# Patient Record
Sex: MALE | Employment: OTHER | URBAN - METROPOLITAN AREA
[De-identification: names, ages, dates, MRNs, and addresses within clinical notes are randomized per-mention and may not be internally consistent; named-entity substitution may affect disease eponyms.]

---

## 2019-02-25 ENCOUNTER — OFFICE VISIT (OUTPATIENT)
Dept: SURGERY | Facility: CLINIC | Age: 54
End: 2019-02-25
Payer: MEDICARE

## 2019-02-25 VITALS
DIASTOLIC BLOOD PRESSURE: 79 MMHG | HEIGHT: 74.4 IN | RESPIRATION RATE: 18 BRPM | BODY MASS INDEX: 36.83 KG/M2 | WEIGHT: 290.06 LBS | HEART RATE: 88 BPM | SYSTOLIC BLOOD PRESSURE: 131 MMHG | OXYGEN SATURATION: 95 %

## 2019-02-25 DIAGNOSIS — Z79.4 TYPE 2 DIABETES MELLITUS WITHOUT COMPLICATION, WITH LONG-TERM CURRENT USE OF INSULIN (HCC): ICD-10-CM

## 2019-02-25 DIAGNOSIS — I10 HYPERTENSION, UNSPECIFIED TYPE: Primary | ICD-10-CM

## 2019-02-25 DIAGNOSIS — E11.9 TYPE 2 DIABETES MELLITUS WITHOUT COMPLICATION, WITH LONG-TERM CURRENT USE OF INSULIN (HCC): ICD-10-CM

## 2019-02-25 DIAGNOSIS — K21.9 GASTROESOPHAGEAL REFLUX DISEASE WITHOUT ESOPHAGITIS: ICD-10-CM

## 2019-02-25 DIAGNOSIS — E55.9 VITAMIN D DEFICIENCY: ICD-10-CM

## 2019-02-25 DIAGNOSIS — G47.33 OSA ON CPAP: ICD-10-CM

## 2019-02-25 DIAGNOSIS — Z99.89 OSA ON CPAP: ICD-10-CM

## 2019-02-25 DIAGNOSIS — E53.8 LOW VITAMIN B12 LEVEL: ICD-10-CM

## 2019-02-25 DIAGNOSIS — E66.01 CLASS 2 SEVERE OBESITY DUE TO EXCESS CALORIES WITH SERIOUS COMORBIDITY AND BODY MASS INDEX (BMI) OF 36.0 TO 36.9 IN ADULT (HCC): ICD-10-CM

## 2019-02-25 PROBLEM — F41.9 ANXIETY: Status: ACTIVE | Noted: 2019-02-25

## 2019-02-25 PROBLEM — E66.9 CLASS 2 OBESITY WITH BODY MASS INDEX (BMI) OF 36.0 TO 36.9 IN ADULT: Status: ACTIVE | Noted: 2019-02-25

## 2019-02-25 PROBLEM — M10.9 GOUT: Status: ACTIVE | Noted: 2019-02-25

## 2019-02-25 PROBLEM — F32.9 MAJOR DEPRESSION: Chronic | Status: ACTIVE | Noted: 2019-02-25

## 2019-02-25 PROCEDURE — 99204 OFFICE O/P NEW MOD 45 MIN: CPT | Performed by: INTERNAL MEDICINE

## 2019-02-25 RX ORDER — HYDROCODONE BITARTRATE AND ACETAMINOPHEN 10; 325 MG/1; MG/1
TABLET ORAL
Refills: 0 | COMMUNITY
Start: 2019-02-21

## 2019-02-25 RX ORDER — RISPERIDONE 3 MG/1
TABLET, FILM COATED ORAL
COMMUNITY
Start: 2019-02-14

## 2019-02-25 RX ORDER — HYDROCHLOROTHIAZIDE 25 MG/1
TABLET ORAL
COMMUNITY
Start: 2018-09-27

## 2019-02-25 RX ORDER — ATORVASTATIN CALCIUM 20 MG/1
TABLET, FILM COATED ORAL
COMMUNITY
Start: 2018-09-27

## 2019-02-25 RX ORDER — PHENTERMINE HYDROCHLORIDE 15 MG/1
CAPSULE ORAL
Refills: 0 | COMMUNITY
Start: 2018-12-14 | End: 2019-02-26 | Stop reason: DRUGHIGH

## 2019-02-25 RX ORDER — SPIRONOLACTONE 25 MG/1
TABLET ORAL
COMMUNITY
Start: 2019-02-15

## 2019-02-25 RX ORDER — SERTRALINE HYDROCHLORIDE 100 MG/1
TABLET, FILM COATED ORAL
COMMUNITY
Start: 2019-02-14

## 2019-02-25 RX ORDER — CELECOXIB 200 MG/1
CAPSULE ORAL
COMMUNITY
Start: 2019-02-15

## 2019-02-25 RX ORDER — POLYETHYLENE GLYCOL 3350 17 G/17G
POWDER, FOR SOLUTION ORAL
COMMUNITY
Start: 2018-06-27

## 2019-02-25 RX ORDER — MELATONIN
1000
COMMUNITY

## 2019-02-25 RX ORDER — ACETAMINOPHEN 500 MG
500 TABLET ORAL
COMMUNITY

## 2019-02-25 RX ORDER — MIRTAZAPINE 30 MG/1
TABLET, FILM COATED ORAL
COMMUNITY
Start: 2019-02-14

## 2019-02-25 RX ORDER — BUPROPION HYDROCHLORIDE 150 MG/1
TABLET, EXTENDED RELEASE ORAL
COMMUNITY
Start: 2019-02-14

## 2019-02-25 RX ORDER — TOPIRAMATE 100 MG/1
TABLET, FILM COATED ORAL
COMMUNITY
Start: 2019-02-14

## 2019-02-25 RX ORDER — BACLOFEN 10 MG/1
TABLET ORAL
COMMUNITY
Start: 2019-02-14

## 2019-02-25 RX ORDER — MEMANTINE HYDROCHLORIDE 10 MG/1
TABLET ORAL
COMMUNITY
Start: 2019-02-14

## 2019-02-25 RX ORDER — ALLOPURINOL 100 MG/1
TABLET ORAL
COMMUNITY
Start: 2019-02-14

## 2019-02-25 RX ORDER — CHOLECALCIFEROL (VITAMIN D3) 125 MCG
CAPSULE ORAL
COMMUNITY

## 2019-02-25 RX ORDER — GABAPENTIN 100 MG/1
CAPSULE ORAL
COMMUNITY
Start: 2019-02-14

## 2019-02-25 RX ORDER — CLONAZEPAM 1 MG/1
TABLET ORAL
Refills: 4 | COMMUNITY
Start: 2019-02-14

## 2019-02-25 RX ORDER — ESOMEPRAZOLE MAGNESIUM 40 MG/1
40 CAPSULE, DELAYED RELEASE ORAL
COMMUNITY

## 2019-02-25 NOTE — PROGRESS NOTES
[facility] Consultation Note       Date of Consult:  2019    Patient:  Jimi Almeida  :      1965  MRN:      [id number]    Referring Provider: Dr. Shelly Blevins       Chief Complaint:  Patient presents with:  Consult: non surgic Disp:  Rfl:    hydrochlorothiazide 25 MG Oral Tab  Disp:  Rfl:    HYDROcodone-acetaminophen  MG Oral Tab TK 1 T PO Q 6 H PRN Disp:  Rfl: 0   Memantine HCl 10 MG Oral Tab  Disp:  Rfl:    metFORMIN HCl 500 MG Oral Tab  Disp:  Rfl:    mirtazapine 30 MG on file        Forced sexual activity: Not on file    Other Topics      Concerns:        Not on file    Social History Narrative      Not on file    Surgical History:    Past Surgical History:   Procedure Laterality Date   • CLOSED RX SKULL FRACTURE He has a normal mood and affect. ASSESSMENT     HYPERTENSION:  The patient's blood pressure has been well controlled. he has been checking it as instructed and has remained in relatively good control.     DIABETES:   The patient denies any episodes 10 minutes per day. 4. Increase fruit and vegetable servings to 5-6 per day. Should come to seminar    Was on phentermine with Dr Junior Arboleda. Was transferred to us. Has been off for two months, and gained 30 lbs. Would like to restart.      Diagnoses

## 2019-02-26 ENCOUNTER — TELEPHONE (OUTPATIENT)
Dept: SURGERY | Facility: CLINIC | Age: 54
End: 2019-02-26

## 2019-02-26 RX ORDER — PHENTERMINE HYDROCHLORIDE 37.5 MG/1
37.5 TABLET ORAL
Qty: 30 TABLET | Refills: 1 | Status: SHIPPED | OUTPATIENT
Start: 2019-02-26 | End: 2019-04-02

## 2019-04-02 ENCOUNTER — OFFICE VISIT (OUTPATIENT)
Dept: SURGERY | Facility: CLINIC | Age: 54
End: 2019-04-02
Payer: MEDICARE

## 2019-04-02 ENCOUNTER — DOCUMENTATION ONLY (OUTPATIENT)
Dept: SURGERY | Facility: CLINIC | Age: 54
End: 2019-04-02

## 2019-04-02 VITALS
WEIGHT: 282.88 LBS | BODY MASS INDEX: 35.92 KG/M2 | SYSTOLIC BLOOD PRESSURE: 146 MMHG | DIASTOLIC BLOOD PRESSURE: 90 MMHG | HEIGHT: 74.5 IN | HEART RATE: 82 BPM | RESPIRATION RATE: 16 BRPM

## 2019-04-02 VITALS
SYSTOLIC BLOOD PRESSURE: 146 MMHG | DIASTOLIC BLOOD PRESSURE: 90 MMHG | BODY MASS INDEX: 35.92 KG/M2 | WEIGHT: 282.88 LBS | HEART RATE: 82 BPM | HEIGHT: 74.5 IN | RESPIRATION RATE: 16 BRPM

## 2019-04-02 DIAGNOSIS — E11.9 TYPE 2 DIABETES MELLITUS WITHOUT COMPLICATION, WITH LONG-TERM CURRENT USE OF INSULIN (HCC): Primary | ICD-10-CM

## 2019-04-02 DIAGNOSIS — I10 HYPERTENSION, UNSPECIFIED TYPE: ICD-10-CM

## 2019-04-02 DIAGNOSIS — K21.9 GASTROESOPHAGEAL REFLUX DISEASE WITHOUT ESOPHAGITIS: ICD-10-CM

## 2019-04-02 DIAGNOSIS — E66.9 OBESITY (BMI 30-39.9): ICD-10-CM

## 2019-04-02 DIAGNOSIS — Z79.4 TYPE 2 DIABETES MELLITUS WITHOUT COMPLICATION, WITH LONG-TERM CURRENT USE OF INSULIN (HCC): Primary | ICD-10-CM

## 2019-04-02 DIAGNOSIS — G47.33 OSA ON CPAP: ICD-10-CM

## 2019-04-02 DIAGNOSIS — Z99.89 OSA ON CPAP: ICD-10-CM

## 2019-04-02 PROCEDURE — 99214 OFFICE O/P EST MOD 30 MIN: CPT | Performed by: INTERNAL MEDICINE

## 2019-04-02 RX ORDER — PHENTERMINE HYDROCHLORIDE 37.5 MG/1
37.5 TABLET ORAL
Qty: 30 TABLET | Refills: 2 | Status: SHIPPED | OUTPATIENT
Start: 2019-04-02 | End: 2019-07-02

## 2019-04-02 NOTE — PROGRESS NOTES
[address] [place] [address]  Dept: [phone]    4/2/2019  Bariatric New Patient Evaluation    Chief Complaint:  obesity     History of Present Illness:  Bety Chimera Family History   Problem Relation Age of Onset   • Other (Other) Mother        Social History:  Social History    Socioeconomic History      Marital status: Single      Spouse name: Not on file      Number of children: Not on file      Years of education , Rfl:       Allergies:  No Known Allergies    ROS:  Constipation, otherwise neg except as above    Physical Exam:  Vital Signs:  /90   Pulse 82   Resp 16   Ht 189.2 cm (6' 2.5\")   Wt 282 lb 14.4 oz (128.3 kg)   BMI 35.84 kg/m²   BMI:  Body mass inde

## 2019-04-02 NOTE — PROGRESS NOTES
[address]  Dept: [phone]       Patient:  Severa Arm  :      1965  MRN:      [id number]    Chief Complaint:  Patient presents wi Esomeprazole Magnesium 40 MG Oral Capsule Delayed Release Take 40 mg by mouth.  Disp:  Rfl:    gabapentin 100 MG Oral Cap  Disp:  Rfl:    hydrochlorothiazide 25 MG Oral Tab  Disp:  Rfl:    HYDROcodone-acetaminophen  MG Oral Tab TK 1 T PO Q 6 H PRN D current or ex partner: Not on file        Emotionally abused: Not on file        Physically abused: Not on file        Forced sexual activity: Not on file    Other Topics      Concerns:        Not on file    Social History Narrative      Not on file    Bonilla alert and oriented  Behavioral/Psych: positive for stress  Endocrine: negative  All other systems were reviewed and are negative    Physical Exam:   General appearance: alert, appears stated age and cooperative  Head: Normocephalic, without obvious abnorma log.  2. Drink 48-64 ounces of non-caloric beverages per day. No fruit juices or regular soda. 3. Increase activity-upper body exercises, walk 10 minutes per day. 4. Increase fruit and vegetable servings to 5-6 per day.       Attended seminar    Toleratin

## 2019-04-02 NOTE — PROGRESS NOTES
Oriented pt to the Bariatric Program; provided/reviewed packet of bariatric info, time line, referrals, etc; discussed BMI status and eligibility for surgery; attended bariatric seminar on 3/11/19; pt verbalized understanding.

## 2019-04-04 ENCOUNTER — TELEPHONE (OUTPATIENT)
Dept: SURGERY | Facility: CLINIC | Age: 54
End: 2019-04-04

## 2019-05-07 ENCOUNTER — OFFICE VISIT (OUTPATIENT)
Dept: NUTRITION/OBESITY MEDICINE | Facility: HOSPITAL | Age: 54
End: 2019-05-07
Attending: SURGERY
Payer: MEDICARE

## 2019-05-07 DIAGNOSIS — E66.01 MORBID OBESITY DUE TO EXCESS CALORIES (HCC): Primary | ICD-10-CM

## 2019-05-07 PROCEDURE — 96130 PSYCL TST EVAL PHYS/QHP 1ST: CPT | Performed by: OTHER

## 2019-05-07 PROCEDURE — 96136 PSYCL/NRPSYC TST PHY/QHP 1ST: CPT | Performed by: OTHER

## 2019-05-07 NOTE — PROGRESS NOTES
Psychological Evaluation    Patient Name: Jason Coates  Visit Date:  2019  :   1965    Reason for Referral:    Shannan Lugo is a 53year old is a single [facility] male who was referred for a psychological evaluation prior to being scheduled f medications include: Atorvastatin, Phentermine, Allopurinol, Cholecalciferol, Topiramate, Metformin, and Spironolactone. As it relates to a familial history of medical issues, [place] reported a positive familial history for diabetes and hypertension. Status Exam:  Pako Raza agreed to participate in the interview and he was cooperative with the interviewer and open in his presentation of personal information. He was oriented as to time, person, and place.  Pako Raza did not demonstrate any abnormalities in hi endorsed good eating patterns overall, with the exception of endorsing that he usually/often: eats sweets, while he sometimes: eats beef, pork, or dark meat chicken more than 2 times a week, uses regular processed meats, uses regular salad dressing, and co progress and determine if he is able to make the necessary changes to follow through with the surgery.   Also, it would be beneficial for Jovanny Elliott to bring a sibling with him to one of his appointments to learn more about the process, as well as for Jovanny Elliott to

## 2019-05-20 ENCOUNTER — APPOINTMENT (OUTPATIENT)
Dept: CARDIOLOGY | Age: 54
End: 2019-05-20

## 2019-05-21 ENCOUNTER — APPOINTMENT (OUTPATIENT)
Dept: NUTRITION/OBESITY MEDICINE | Facility: HOSPITAL | Age: 54
End: 2019-05-21
Attending: SURGERY
Payer: MEDICARE

## 2019-05-22 ENCOUNTER — TELEPHONE (OUTPATIENT)
Dept: SURGERY | Facility: CLINIC | Age: 54
End: 2019-05-22

## 2019-05-22 DIAGNOSIS — K21.9 GASTROESOPHAGEAL REFLUX DISEASE WITHOUT ESOPHAGITIS: ICD-10-CM

## 2019-05-22 DIAGNOSIS — R73.09 ABNORMAL BLOOD SUGAR: ICD-10-CM

## 2019-05-22 DIAGNOSIS — E53.8 LOW VITAMIN B12 LEVEL: ICD-10-CM

## 2019-05-22 DIAGNOSIS — Z79.4 TYPE 2 DIABETES MELLITUS WITHOUT COMPLICATION, WITH LONG-TERM CURRENT USE OF INSULIN (HCC): Primary | ICD-10-CM

## 2019-05-22 DIAGNOSIS — G47.33 OSA ON CPAP: ICD-10-CM

## 2019-05-22 DIAGNOSIS — I10 HYPERTENSION, UNSPECIFIED TYPE: ICD-10-CM

## 2019-05-22 DIAGNOSIS — E11.9 TYPE 2 DIABETES MELLITUS WITHOUT COMPLICATION, WITH LONG-TERM CURRENT USE OF INSULIN (HCC): Primary | ICD-10-CM

## 2019-05-22 DIAGNOSIS — E66.9 OBESITY (BMI 30-39.9): ICD-10-CM

## 2019-05-22 DIAGNOSIS — E55.9 VITAMIN D DEFICIENCY: ICD-10-CM

## 2019-05-22 DIAGNOSIS — Z99.89 OSA ON CPAP: ICD-10-CM

## 2019-05-25 ENCOUNTER — LAB ENCOUNTER (OUTPATIENT)
Dept: LAB | Facility: HOSPITAL | Age: 54
End: 2019-05-25
Attending: INTERNAL MEDICINE
Payer: MEDICARE

## 2019-05-25 DIAGNOSIS — E53.8 LOW VITAMIN B12 LEVEL: ICD-10-CM

## 2019-05-25 DIAGNOSIS — E55.9 VITAMIN D DEFICIENCY: ICD-10-CM

## 2019-05-25 DIAGNOSIS — R73.09 ABNORMAL BLOOD SUGAR: ICD-10-CM

## 2019-05-25 DIAGNOSIS — I10 HYPERTENSION, UNSPECIFIED TYPE: ICD-10-CM

## 2019-05-25 DIAGNOSIS — Z79.4 TYPE 2 DIABETES MELLITUS WITHOUT COMPLICATION, WITH LONG-TERM CURRENT USE OF INSULIN (HCC): ICD-10-CM

## 2019-05-25 DIAGNOSIS — E11.9 TYPE 2 DIABETES MELLITUS WITHOUT COMPLICATION, WITH LONG-TERM CURRENT USE OF INSULIN (HCC): ICD-10-CM

## 2019-05-25 DIAGNOSIS — G47.33 OSA ON CPAP: ICD-10-CM

## 2019-05-25 DIAGNOSIS — E66.9 OBESITY (BMI 30-39.9): ICD-10-CM

## 2019-05-25 DIAGNOSIS — Z99.89 OSA ON CPAP: ICD-10-CM

## 2019-05-25 DIAGNOSIS — K21.9 GASTROESOPHAGEAL REFLUX DISEASE WITHOUT ESOPHAGITIS: ICD-10-CM

## 2019-05-25 PROCEDURE — 82746 ASSAY OF FOLIC ACID SERUM: CPT

## 2019-05-25 PROCEDURE — 80053 COMPREHEN METABOLIC PANEL: CPT

## 2019-05-25 PROCEDURE — 83036 HEMOGLOBIN GLYCOSYLATED A1C: CPT

## 2019-05-25 PROCEDURE — 84425 ASSAY OF VITAMIN B-1: CPT

## 2019-05-25 PROCEDURE — 80061 LIPID PANEL: CPT

## 2019-05-25 PROCEDURE — 82306 VITAMIN D 25 HYDROXY: CPT

## 2019-05-25 PROCEDURE — 83721 ASSAY OF BLOOD LIPOPROTEIN: CPT

## 2019-05-25 PROCEDURE — 85027 COMPLETE CBC AUTOMATED: CPT

## 2019-05-25 PROCEDURE — 82607 VITAMIN B-12: CPT

## 2019-05-25 PROCEDURE — 83735 ASSAY OF MAGNESIUM: CPT

## 2019-05-25 PROCEDURE — 84443 ASSAY THYROID STIM HORMONE: CPT

## 2019-05-25 PROCEDURE — 84466 ASSAY OF TRANSFERRIN: CPT

## 2019-05-25 PROCEDURE — 84100 ASSAY OF PHOSPHORUS: CPT

## 2019-05-25 PROCEDURE — 82728 ASSAY OF FERRITIN: CPT

## 2019-05-25 PROCEDURE — 36415 COLL VENOUS BLD VENIPUNCTURE: CPT

## 2019-05-25 PROCEDURE — 83540 ASSAY OF IRON: CPT

## 2019-05-29 ENCOUNTER — TELEPHONE (OUTPATIENT)
Dept: SURGERY | Facility: CLINIC | Age: 54
End: 2019-05-29

## 2019-05-29 DIAGNOSIS — E55.9 VITAMIN D DEFICIENCY: Primary | ICD-10-CM

## 2019-05-29 RX ORDER — ERGOCALCIFEROL (VITAMIN D2) 1250 MCG
50000 CAPSULE ORAL WEEKLY
Qty: 12 CAPSULE | Refills: 2 | Status: SHIPPED | OUTPATIENT
Start: 2019-05-29 | End: 2019-08-15

## 2019-05-29 NOTE — TELEPHONE ENCOUNTER
Called patient to discuss labs. No answer, left message    Patient's vitamin D level was low and will require Drisdol 50,000 I.U. Weekly for 12 weeks.     OTC b12

## 2019-05-30 ENCOUNTER — TELEPHONE (OUTPATIENT)
Dept: SURGERY | Facility: CLINIC | Age: 54
End: 2019-05-30

## 2019-06-06 ENCOUNTER — OFFICE VISIT (OUTPATIENT)
Dept: SURGERY | Facility: CLINIC | Age: 54
End: 2019-06-06
Payer: MEDICARE

## 2019-06-06 VITALS — BODY MASS INDEX: 35.85 KG/M2 | HEIGHT: 74.5 IN | WEIGHT: 282.31 LBS

## 2019-06-06 DIAGNOSIS — E66.01 CLASS 2 SEVERE OBESITY DUE TO EXCESS CALORIES WITH SERIOUS COMORBIDITY AND BODY MASS INDEX (BMI) OF 35.0 TO 35.9 IN ADULT (HCC): Primary | ICD-10-CM

## 2019-06-06 PROCEDURE — 97802 MEDICAL NUTRITION INDIV IN: CPT | Performed by: DIETITIAN, REGISTERED

## 2019-06-06 NOTE — PROGRESS NOTES
[address] [place] [address] AND WEIGHT LOSS CLINIC  [address] [place] [address]  Dept: [phone]  Loc: [phone]    06/06/19    Bariatric Initial Nutrition Assessment    Tasia Perez is a 53year old male.     Referring binge eating behavior: Pt denies.     Patient engages in binge-purge behavior: no    Patient uses laxatives or vomits as a means of purging: no    Patient complains of: constipation    Patient's motivation for bariatric surgery: Doesn't like being [place] mg/dL   Borderline 100-129 mg/dL   High     >=130mg/dL        VLDL  05/25/2019      Comment:      Unable to calculate VLDL due to Triglycerides >400 mg/dL    NONHDLC 175 (H) 05/25/2019       Vitamins/Minerals:  Lab Results   Component Value Date    B12 351 •  risperiDONE 3 MG Oral Tab, , Disp: , Rfl:   •  Sertraline HCl 100 MG Oral Tab, , Disp: , Rfl:   •  topiramate 100 MG Oral Tab, , Disp: , Rfl:   •  spironolactone 25 MG Oral Tab, , Disp: , Rfl:     Vitamins/Minerals: Vit D 50,000 IU/wk  Food Intoleranc fluid intake, start eating breakfast, and include protein with each meal. Pt appeared to have trouble grasping some basic nutrition concepts, will likely need more than three dietitian visits.     Nutrition Diagnosis: Morbid obesity related to undesirable f

## 2019-06-18 ENCOUNTER — OFFICE VISIT (OUTPATIENT)
Dept: NUTRITION/OBESITY MEDICINE | Facility: HOSPITAL | Age: 54
End: 2019-06-18
Attending: INTERNAL MEDICINE
Payer: MEDICARE

## 2019-06-18 DIAGNOSIS — E66.01 MORBID OBESITY DUE TO EXCESS CALORIES (HCC): Primary | ICD-10-CM

## 2019-06-18 PROBLEM — F41.9 ANXIETY: Status: ACTIVE | Noted: 2019-06-18

## 2019-06-18 PROBLEM — I10 HYPERTENSION: Status: ACTIVE | Noted: 2019-06-18

## 2019-06-18 PROBLEM — K21.9 GERD (GASTROESOPHAGEAL REFLUX DISEASE): Status: ACTIVE | Noted: 2019-06-18

## 2019-06-18 PROBLEM — E11.9 DM (DIABETES MELLITUS), TYPE 2 (CMD): Status: ACTIVE | Noted: 2019-06-18

## 2019-06-18 PROBLEM — M10.9 GOUT: Status: ACTIVE | Noted: 2019-06-18

## 2019-06-18 PROCEDURE — 96130 PSYCL TST EVAL PHYS/QHP 1ST: CPT | Performed by: OTHER

## 2019-06-18 RX ORDER — BUPROPION HYDROCHLORIDE 150 MG/1
1 TABLET, EXTENDED RELEASE ORAL DAILY
COMMUNITY
Start: 2019-02-14

## 2019-06-18 RX ORDER — CELECOXIB 200 MG/1
1 CAPSULE ORAL DAILY
COMMUNITY
Start: 2019-02-15

## 2019-06-18 RX ORDER — SERTRALINE HYDROCHLORIDE 100 MG/1
1 TABLET, FILM COATED ORAL DAILY
COMMUNITY
Start: 2019-02-14

## 2019-06-18 RX ORDER — GABAPENTIN 100 MG/1
1 CAPSULE ORAL DAILY
COMMUNITY
Start: 2019-02-14

## 2019-06-18 RX ORDER — CLONAZEPAM 1 MG/1
1 TABLET ORAL DAILY
COMMUNITY
Start: 2019-02-14

## 2019-06-18 RX ORDER — HYDROCODONE BITARTRATE AND ACETAMINOPHEN 10; 325 MG/1; MG/1
1 TABLET ORAL DAILY
COMMUNITY
Start: 2019-02-21

## 2019-06-18 RX ORDER — CHOLECALCIFEROL (VITAMIN D3) 125 MCG
1 CAPSULE ORAL DAILY
COMMUNITY

## 2019-06-18 RX ORDER — ACETAMINOPHEN 500 MG
500 TABLET ORAL DAILY
COMMUNITY

## 2019-06-18 RX ORDER — LANOLIN ALCOHOL/MO/W.PET/CERES
1000 CREAM (GRAM) TOPICAL DAILY
COMMUNITY

## 2019-06-18 RX ORDER — BACLOFEN 10 MG/1
1 TABLET ORAL DAILY
COMMUNITY
Start: 2019-02-14

## 2019-06-18 RX ORDER — RISPERIDONE 3 MG/1
1 TABLET ORAL DAILY
COMMUNITY
Start: 2019-02-14

## 2019-06-18 RX ORDER — ATORVASTATIN CALCIUM 20 MG/1
1 TABLET, FILM COATED ORAL DAILY
COMMUNITY
Start: 2018-09-27

## 2019-06-18 RX ORDER — ESOMEPRAZOLE MAGNESIUM 40 MG/1
40 CAPSULE, DELAYED RELEASE ORAL
COMMUNITY

## 2019-06-18 RX ORDER — ERGOCALCIFEROL 1.25 MG/1
50000 CAPSULE ORAL
COMMUNITY
Start: 2019-05-29 | End: 2019-08-15

## 2019-06-18 RX ORDER — MEMANTINE HYDROCHLORIDE 10 MG/1
10 TABLET ORAL DAILY
COMMUNITY
Start: 2019-02-14

## 2019-06-18 RX ORDER — SPIRONOLACTONE 25 MG/1
1 TABLET ORAL DAILY
COMMUNITY
Start: 2019-02-15

## 2019-06-18 RX ORDER — TOPIRAMATE 100 MG/1
1 TABLET, FILM COATED ORAL DAILY
COMMUNITY
Start: 2019-02-14

## 2019-06-18 RX ORDER — CLONAZEPAM 1 MG/1
1 TABLET ORAL DAILY
Refills: 1 | COMMUNITY
Start: 2019-05-13

## 2019-06-18 RX ORDER — ALLOPURINOL 100 MG/1
1 TABLET ORAL DAILY
COMMUNITY
Start: 2019-02-14

## 2019-06-18 RX ORDER — MIRTAZAPINE 30 MG/1
1 TABLET, FILM COATED ORAL DAILY
COMMUNITY
Start: 2019-02-14

## 2019-06-18 RX ORDER — PHENTERMINE HYDROCHLORIDE 37.5 MG/1
37.5 TABLET ORAL DAILY
COMMUNITY
Start: 2019-04-02

## 2019-06-18 RX ORDER — HYDROCHLOROTHIAZIDE 25 MG/1
1 TABLET ORAL DAILY
COMMUNITY
Start: 2018-09-27

## 2019-06-18 RX ORDER — POLYETHYLENE GLYCOL 3350 17 G/17G
1 POWDER, FOR SOLUTION ORAL SEE ADMIN INSTRUCTIONS
COMMUNITY
Start: 2018-06-27

## 2019-06-18 NOTE — PROGRESS NOTES
This writer met with Mr. Matthew Gallagher for a reassessment. His affect was somewhat constricted, but his mood fell within the normal range.  The rate of his speech was slow and he demonstrated latent responses, in that he appeared to take him some time to process have had trouble understanding the inventory based on some of his responses (e.g., he endorsed skipping breakfast as \"does not apply to me\").       This writer also administered Mr. English Neigh the MMSE based on his history of a TBI, behavioral observations, a

## 2019-06-19 NOTE — PROGRESS NOTES
This writer spoke with Dr. Gabino Betts, Mr. Homer Casas' psychiatrist.  However, Dr. Gabino Betts related that he has not seen Mr. Homer Casas since December, while Mr. Homer Casas stated that he last met with his psychiatrist 6 months ago. Dr. Gabino Betts stated that while Mr. Valentin Clarke

## 2019-06-20 ENCOUNTER — OFFICE VISIT (OUTPATIENT)
Dept: CARDIOLOGY | Age: 54
End: 2019-06-20

## 2019-06-20 DIAGNOSIS — I10 HYPERTENSION, UNSPECIFIED TYPE: Primary | ICD-10-CM

## 2019-06-20 DIAGNOSIS — Z01.818 PRE-OP EVALUATION: ICD-10-CM

## 2019-06-20 PROCEDURE — 3078F DIAST BP <80 MM HG: CPT | Performed by: INTERNAL MEDICINE

## 2019-06-20 PROCEDURE — 99203 OFFICE O/P NEW LOW 30 MIN: CPT | Performed by: INTERNAL MEDICINE

## 2019-06-20 PROCEDURE — 3074F SYST BP LT 130 MM HG: CPT | Performed by: INTERNAL MEDICINE

## 2019-06-20 PROCEDURE — 93000 ELECTROCARDIOGRAM COMPLETE: CPT | Performed by: INTERNAL MEDICINE

## 2019-06-20 PROCEDURE — X1094 ELECTROCARDIOGRAM 12-LEAD: HCPCS | Performed by: INTERNAL MEDICINE

## 2019-06-20 ASSESSMENT — PAIN SCALES - GENERAL: PAINLEVEL: 0

## 2019-07-02 ENCOUNTER — OFFICE VISIT (OUTPATIENT)
Dept: SURGERY | Facility: CLINIC | Age: 54
End: 2019-07-02
Payer: MEDICARE

## 2019-07-02 ENCOUNTER — TELEPHONE (OUTPATIENT)
Dept: SURGERY | Facility: CLINIC | Age: 54
End: 2019-07-02

## 2019-07-02 VITALS
DIASTOLIC BLOOD PRESSURE: 78 MMHG | OXYGEN SATURATION: 95 % | BODY MASS INDEX: 36.19 KG/M2 | WEIGHT: 285 LBS | HEART RATE: 85 BPM | HEIGHT: 74.5 IN | SYSTOLIC BLOOD PRESSURE: 118 MMHG

## 2019-07-02 VITALS
WEIGHT: 285 LBS | BODY MASS INDEX: 36.19 KG/M2 | HEART RATE: 89 BPM | OXYGEN SATURATION: 96 % | HEIGHT: 74.5 IN | SYSTOLIC BLOOD PRESSURE: 131 MMHG | RESPIRATION RATE: 16 BRPM | DIASTOLIC BLOOD PRESSURE: 82 MMHG

## 2019-07-02 DIAGNOSIS — Z79.4 TYPE 2 DIABETES MELLITUS WITHOUT COMPLICATION, WITH LONG-TERM CURRENT USE OF INSULIN (HCC): ICD-10-CM

## 2019-07-02 DIAGNOSIS — E11.9 TYPE 2 DIABETES MELLITUS WITHOUT COMPLICATION, WITH LONG-TERM CURRENT USE OF INSULIN (HCC): ICD-10-CM

## 2019-07-02 DIAGNOSIS — I10 HYPERTENSION, UNSPECIFIED TYPE: ICD-10-CM

## 2019-07-02 DIAGNOSIS — K21.9 GASTROESOPHAGEAL REFLUX DISEASE WITHOUT ESOPHAGITIS: Primary | ICD-10-CM

## 2019-07-02 DIAGNOSIS — Z99.89 OSA ON CPAP: ICD-10-CM

## 2019-07-02 DIAGNOSIS — E66.9 OBESITY (BMI 30-39.9): ICD-10-CM

## 2019-07-02 DIAGNOSIS — G47.33 OSA ON CPAP: ICD-10-CM

## 2019-07-02 PROCEDURE — 99214 OFFICE O/P EST MOD 30 MIN: CPT | Performed by: INTERNAL MEDICINE

## 2019-07-02 RX ORDER — PHENTERMINE HYDROCHLORIDE 37.5 MG/1
37.5 TABLET ORAL
Qty: 30 TABLET | Refills: 2 | Status: SHIPPED | OUTPATIENT
Start: 2019-07-02

## 2019-07-02 NOTE — PROGRESS NOTES
[address]  Dept: [phone]       Patient:  Gricelda Cooney  :      1965  MRN:      [id number]    Chief Complaint:  Patient presents wi Rfl:    celecoxib 200 MG Oral Cap  Disp:  Rfl:    clonazePAM 1 MG Oral Tab  Disp:  Rfl: 4   Vitamin B-12 1000 MCG Oral Tab Take 1,000 mcg by mouth. Disp:  Rfl:    Esomeprazole Magnesium 40 MG Oral Capsule Delayed Release Take 40 mg by mouth.  Disp:  Rfl: on file        Attends meetings of clubs or organizations: Not on file        Relationship status: Not on file      Intimate partner violence:        Fear of current or ex partner: Not on file        Emotionally abused: Not on file        Physically abused exertion  Cardiovascular: negative  Gastrointestinal: positive for reflux symptoms  Musculoskeletal:positive for arthralgias and back pain  Neurological: awake and alert today.  alert and oriented  Behavioral/Psych: positive for stress  Endocrine: negative pressure stable on the above medications. No interval change in antihypertensive medication. DIABETES: Continue current medications. Goals for next month:  1. Keep a food log. 2. Drink 48-64 ounces of non-caloric beverages per day.  No fruit juices

## 2019-07-02 NOTE — PROGRESS NOTES
[address], [address] [address]  Dept: [phone]    7/2/2019    BARIATRIC EXISTING PATIENT/FOLLOW UP    HPI:  Ava Celestino is a 53year old-year old male who pre with his diet and he will be okay for surgery    Plan: Follow-up with me in 1 month    Donald Rudolph MD  7/2/2019

## 2019-07-03 ENCOUNTER — TELEPHONE (OUTPATIENT)
Dept: SURGERY | Facility: CLINIC | Age: 54
End: 2019-07-03

## 2019-07-17 ENCOUNTER — OFFICE VISIT (OUTPATIENT)
Dept: PULMONOLOGY | Facility: CLINIC | Age: 54
End: 2019-07-17
Payer: MEDICARE

## 2019-07-17 VITALS
RESPIRATION RATE: 18 BRPM | OXYGEN SATURATION: 97 % | SYSTOLIC BLOOD PRESSURE: 131 MMHG | DIASTOLIC BLOOD PRESSURE: 83 MMHG | WEIGHT: 283 LBS | BODY MASS INDEX: 35.19 KG/M2 | HEART RATE: 103 BPM | HEIGHT: 75 IN

## 2019-07-17 DIAGNOSIS — G47.33 OSA ON CPAP: ICD-10-CM

## 2019-07-17 DIAGNOSIS — Z99.89 OSA ON CPAP: ICD-10-CM

## 2019-07-17 DIAGNOSIS — E66.01 CLASS 2 SEVERE OBESITY DUE TO EXCESS CALORIES WITH SERIOUS COMORBIDITY AND BODY MASS INDEX (BMI) OF 35.0 TO 35.9 IN ADULT (HCC): Primary | ICD-10-CM

## 2019-07-17 PROCEDURE — 99204 OFFICE O/P NEW MOD 45 MIN: CPT | Performed by: INTERNAL MEDICINE

## 2019-07-17 NOTE — PROGRESS NOTES
[facility], [place], [place]    Report of Consultation    Mahsa Rosamaria Patient Status:  Outpatient    1965 MRN [id number]   Location [facility] [place], [place] Never Used    Alcohol use: No      Frequency: Never    Drug use: No         Current Medications:    No current facility-administered medications for this visit.      (Not in a hospital admission)    Allergies  No Known Allergies    Review of Systems:    Per compliance with nasal CPAP  I do not have download today in my office  Studies were done at [address] excellent subjective compliance  ESS within normal limits  Advised patient to use CPAP every night and all night  Diet and exercise and w

## 2019-07-30 ENCOUNTER — OFFICE VISIT (OUTPATIENT)
Dept: SURGERY | Facility: CLINIC | Age: 54
End: 2019-07-30
Payer: MEDICARE

## 2019-07-30 VITALS
WEIGHT: 290 LBS | OXYGEN SATURATION: 93 % | HEIGHT: 74.5 IN | SYSTOLIC BLOOD PRESSURE: 129 MMHG | BODY MASS INDEX: 36.82 KG/M2 | DIASTOLIC BLOOD PRESSURE: 85 MMHG | TEMPERATURE: 97 F | HEART RATE: 88 BPM

## 2019-07-30 RX ORDER — CYCLOBENZAPRINE HCL 10 MG
TABLET ORAL
COMMUNITY

## 2019-07-30 NOTE — PROGRESS NOTES
[address], [address] [id number] [facility] [address]  Dept: [phone]    7/30/2019    BARIATRIC EXISTING PATIENT/FOLLOW UP    HPI:  Pj Yana is a 53year old-year old male who pr Follow-up with me in 1 month    Blank Lancaster MD  7/30/2019

## 2019-08-07 ENCOUNTER — OFFICE VISIT (OUTPATIENT)
Dept: SURGERY | Facility: CLINIC | Age: 54
End: 2019-08-07

## 2019-08-07 VITALS — HEIGHT: 72.25 IN | BODY MASS INDEX: 39.32 KG/M2 | WEIGHT: 293.5 LBS

## 2019-08-07 DIAGNOSIS — E66.01 CLASS 2 SEVERE OBESITY DUE TO EXCESS CALORIES WITH SERIOUS COMORBIDITY AND BODY MASS INDEX (BMI) OF 36.0 TO 36.9 IN ADULT (HCC): Primary | ICD-10-CM

## 2019-08-07 NOTE — PROGRESS NOTES
[address] [place] [address] AND WEIGHT LOSS CLINIC  [address] [place] [address]  Dept: [phone]  Loc: [phone]    08/07/19      Bariatric Follow-up Nutrition Session    Amy Salinas is a 53year old male.      Assessm VLDL due to Triglycerides >400 mg/dL    NONHDLC 175 (H) 05/25/2019        Vitamins/Minerals:  Lab Results   Component Value Date    B12 351 05/25/2019     Lab Results   Component Value Date    WNVW87TS 22.4 (L) 05/25/2019     Lab Results   Component Value 3350 Oral Powder, TAKE 17 GIVE BY MOUTH DAILY.  ( MIX AS DIRECTED ), Disp: , Rfl:   •  risperiDONE 3 MG Oral Tab, , Disp: , Rfl:   •  Sertraline HCl 100 MG Oral Tab, , Disp: , Rfl:   •  topiramate 100 MG Oral Tab, , Disp: , Rfl:   •  spironolactone 25 MG Or Overall, pt continues to struggle with basic nutrition concepts required for post surgery success. Will need more visits to assess further.     Nutrition Diagnosis     Nutrition Diagnosis: Morbid obesity related to undesirable food choices and physical inac

## 2021-05-26 VITALS
BODY MASS INDEX: 23 KG/M2 | SYSTOLIC BLOOD PRESSURE: 110 MMHG | OXYGEN SATURATION: 98 % | DIASTOLIC BLOOD PRESSURE: 70 MMHG | HEIGHT: 75 IN | WEIGHT: 185 LBS | HEART RATE: 88 BPM

## (undated) NOTE — MR AVS SNAPSHOT
After Visit Summary   7/30/2019    Sylvester Greco    MRN: [id number]           Visit Information     Date & Time  7/30/2019 10:00 AM Provider  Pradeep Hernandez MD [address] [place] [address],[address], [facility]/[employer].  Phone  [phone] topiramate 100 MG Oral Tab     spironolactone 25 MG Oral Tab       Future Appointments        Provider Department    8/7/2019 3:00 PM Amadeo Briggs [address], [address] [facility],[address], [place]    8/21/2019 3:00 PM Edgardo Jacobs If you have questions, you can call [phone] to talk to our [address] staff. Remember, [employer] is NOT to be used for urgent needs. For medical emergencies, dial 911.     Sincerely,    Jan Fan MD              [facility] now offers Video Visits through Saturday - Sunday  10:00 am - 4:00 pm       Conditions needing urgent attention, but are not life-threatening.          Average cost  $120*       EMERGENCY ROOM         Life-threatening emergencies needing immediate intervention   at a hospital emergency ro

## (undated) NOTE — MR AVS SNAPSHOT
After Visit Summary   4/2/2019    Jason Coates    MRN: [id number]           Visit Information     Date & Time  4/2/2019 10:30 AM Provider  Jose F Hawkins MD [address] [place] [address],[address], [facility]/[employer].  Phone  [phone] to securely access your online medical record. [employer] allows you to send messages to your doctor, view test results, renew prescriptions and request appointments. How Do I Sign Up? 1. In your Internet browser, go to [email or web address] complete it and provide feedback. We strive to deliver the best patient experience and are looking for ways to make improvements. Your feedback will help us do so. For more information on [employer] please visit [email or web address]

## (undated) NOTE — MR AVS SNAPSHOT
After Visit Summary   7/2/2019    Sharita Barnett    MRN: [id number]           Visit Information     Date & Time  7/2/2019 10:30 AM Provider  Nathaly Varela MD [address] [place] [address],[address], [facility]/[employer].  Phone  [phone] [address], [place]    7/17/2019 3:00 PM Per [facility], [address] [place] [address], [place]       July 2, 2019      Genoveva Ambrosio   [address]   [address]  [address] [place] [address]     Dear Rolando Gomez :     Thank you for enrolling in patients. Video Visits are available Monday - Friday for many common conditions such as allergies, colds, cough, fever, rash, sore throat, headache and pink eye.   The cost for a Video Visit is currently $35.         If you receive a survey from [employer] *Cost varies based on your insurance coverage  For more information about hours, locations or appointment options available at [facility],  visit: [email or web address] or call [phone]. ([phone])

## (undated) NOTE — LETTER
2019    Patient: Dano Vasquez  : 1965 Visit date: 2019    Dear  Dr. Rene Umanzor MD,    Dottie Den is a pleasant 46year old male, who was referred to us for weight management recommendations.   Dottieeric Crenshaw is interested in our weight loss progra